# Patient Record
Sex: FEMALE | Race: WHITE | Employment: STUDENT | URBAN - METROPOLITAN AREA
[De-identification: names, ages, dates, MRNs, and addresses within clinical notes are randomized per-mention and may not be internally consistent; named-entity substitution may affect disease eponyms.]

---

## 2018-12-27 ENCOUNTER — HOSPITAL ENCOUNTER (EMERGENCY)
Facility: CLINIC | Age: 23
Discharge: HOME | End: 2018-12-27
Admitting: PHYSICIAN ASSISTANT
Payer: COMMERCIAL

## 2018-12-27 ENCOUNTER — OFFICE VISIT (OUTPATIENT)
Dept: FAMILY MEDICINE | Facility: CLINIC | Age: 23
End: 2018-12-27
Payer: COMMERCIAL

## 2018-12-27 ENCOUNTER — HOSPITAL ENCOUNTER (EMERGENCY)
Facility: CLINIC | Age: 23
Discharge: HOME | End: 2018-12-27
Attending: EMERGENCY MEDICINE | Admitting: EMERGENCY MEDICINE
Payer: COMMERCIAL

## 2018-12-27 VITALS
WEIGHT: 130 LBS | TEMPERATURE: 98 F | HEIGHT: 66 IN | BODY MASS INDEX: 20.89 KG/M2 | RESPIRATION RATE: 16 BRPM | OXYGEN SATURATION: 100 % | DIASTOLIC BLOOD PRESSURE: 77 MMHG | SYSTOLIC BLOOD PRESSURE: 111 MMHG | HEART RATE: 87 BPM

## 2018-12-27 VITALS
TEMPERATURE: 98.4 F | DIASTOLIC BLOOD PRESSURE: 64 MMHG | OXYGEN SATURATION: 97 % | WEIGHT: 130 LBS | HEART RATE: 78 BPM | BODY MASS INDEX: 20.4 KG/M2 | HEIGHT: 67 IN | SYSTOLIC BLOOD PRESSURE: 99 MMHG | RESPIRATION RATE: 15 BRPM

## 2018-12-27 VITALS
TEMPERATURE: 98.4 F | HEART RATE: 89 BPM | OXYGEN SATURATION: 100 % | WEIGHT: 131.8 LBS | DIASTOLIC BLOOD PRESSURE: 69 MMHG | SYSTOLIC BLOOD PRESSURE: 109 MMHG

## 2018-12-27 DIAGNOSIS — F41.9 ANXIETY: Primary | ICD-10-CM

## 2018-12-27 DIAGNOSIS — R25.1 TREMOR: ICD-10-CM

## 2018-12-27 DIAGNOSIS — R20.2 PARESTHESIAS: ICD-10-CM

## 2018-12-27 DIAGNOSIS — R11.15 INTRACTABLE CYCLICAL VOMITING WITH NAUSEA: ICD-10-CM

## 2018-12-27 DIAGNOSIS — F43.0 STRESS RESPONSE: ICD-10-CM

## 2018-12-27 LAB
ALBUMIN SERPL-MCNC: 3.7 G/DL (ref 3.4–5)
ALP SERPL-CCNC: 77 U/L (ref 40–150)
ALT SERPL W P-5'-P-CCNC: 66 U/L (ref 0–50)
ANION GAP SERPL CALCULATED.3IONS-SCNC: 9 MMOL/L (ref 3–14)
AST SERPL W P-5'-P-CCNC: 74 U/L (ref 0–45)
BASOPHILS # BLD AUTO: 0 10E9/L (ref 0–0.2)
BASOPHILS # BLD AUTO: 0.1 10E9/L (ref 0–0.2)
BASOPHILS NFR BLD AUTO: 0.5 %
BASOPHILS NFR BLD AUTO: 0.6 %
BILIRUB SERPL-MCNC: 0.3 MG/DL (ref 0.2–1.3)
BUN SERPL-MCNC: 12 MG/DL (ref 7–30)
CALCIUM SERPL-MCNC: 8.7 MG/DL (ref 8.5–10.1)
CHLORIDE SERPL-SCNC: 105 MMOL/L (ref 94–109)
CO2 SERPL-SCNC: 25 MMOL/L (ref 20–32)
CREAT SERPL-MCNC: 0.75 MG/DL (ref 0.52–1.04)
CRP SERPL-MCNC: <2.9 MG/L (ref 0–8)
DEPRECATED S PYO AG THROAT QL EIA: NORMAL
DEPRECATED S PYO AG THROAT QL EIA: NORMAL
DIFFERENTIAL METHOD BLD: NORMAL
DIFFERENTIAL METHOD BLD: NORMAL
EOSINOPHIL # BLD AUTO: 0.1 10E9/L (ref 0–0.7)
EOSINOPHIL # BLD AUTO: 0.2 10E9/L (ref 0–0.7)
EOSINOPHIL NFR BLD AUTO: 1.3 %
EOSINOPHIL NFR BLD AUTO: 2 %
ERYTHROCYTE [DISTWIDTH] IN BLOOD BY AUTOMATED COUNT: 12.5 % (ref 10–15)
ERYTHROCYTE [DISTWIDTH] IN BLOOD BY AUTOMATED COUNT: 12.6 % (ref 10–15)
ERYTHROCYTE [SEDIMENTATION RATE] IN BLOOD BY WESTERGREN METHOD: 9 MM/H (ref 0–20)
GFR SERPL CREATININE-BSD FRML MDRD: >90 ML/MIN/{1.73_M2}
GLUCOSE SERPL-MCNC: 90 MG/DL (ref 70–99)
HCT VFR BLD AUTO: 38.6 % (ref 35–47)
HCT VFR BLD AUTO: 41.2 % (ref 35–47)
HGB BLD-MCNC: 12.9 G/DL (ref 11.7–15.7)
HGB BLD-MCNC: 13.6 G/DL (ref 11.7–15.7)
IMM GRANULOCYTES # BLD: 0 10E9/L (ref 0–0.4)
IMM GRANULOCYTES NFR BLD: 0.5 %
LYMPHOCYTES # BLD AUTO: 1.9 10E9/L (ref 0.8–5.3)
LYMPHOCYTES # BLD AUTO: 2.4 10E9/L (ref 0.8–5.3)
LYMPHOCYTES NFR BLD AUTO: 23.6 %
LYMPHOCYTES NFR BLD AUTO: 28.1 %
MCH RBC QN AUTO: 29.6 PG (ref 26.5–33)
MCH RBC QN AUTO: 29.9 PG (ref 26.5–33)
MCHC RBC AUTO-ENTMCNC: 33 G/DL (ref 31.5–36.5)
MCHC RBC AUTO-ENTMCNC: 33.4 G/DL (ref 31.5–36.5)
MCV RBC AUTO: 90 FL (ref 78–100)
MCV RBC AUTO: 90 FL (ref 78–100)
MONOCYTES # BLD AUTO: 0.7 10E9/L (ref 0–1.3)
MONOCYTES # BLD AUTO: 0.8 10E9/L (ref 0–1.3)
MONOCYTES NFR BLD AUTO: 10.2 %
MONOCYTES NFR BLD AUTO: 8.4 %
NEUTROPHILS # BLD AUTO: 5 10E9/L (ref 1.6–8.3)
NEUTROPHILS # BLD AUTO: 5.2 10E9/L (ref 1.6–8.3)
NEUTROPHILS NFR BLD AUTO: 61 %
NEUTROPHILS NFR BLD AUTO: 63.8 %
NRBC # BLD AUTO: 0 10*3/UL
NRBC BLD AUTO-RTO: 0 /100
PLATELET # BLD AUTO: 312 10E9/L (ref 150–450)
PLATELET # BLD AUTO: 372 10E9/L (ref 150–450)
POTASSIUM SERPL-SCNC: 3.8 MMOL/L (ref 3.4–5.3)
PROT SERPL-MCNC: 7.6 G/DL (ref 6.8–8.8)
RBC # BLD AUTO: 4.31 10E12/L (ref 3.8–5.2)
RBC # BLD AUTO: 4.6 10E12/L (ref 3.8–5.2)
SODIUM SERPL-SCNC: 139 MMOL/L (ref 133–144)
SPECIMEN SOURCE: NORMAL
T3FREE SERPL-MCNC: 2.4 PG/ML (ref 2.3–4.2)
WBC # BLD AUTO: 7.9 10E9/L (ref 4–11)
WBC # BLD AUTO: 8.5 10E9/L (ref 4–11)

## 2018-12-27 PROCEDURE — 90791 PSYCH DIAGNOSTIC EVALUATION: CPT

## 2018-12-27 PROCEDURE — 96375 TX/PRO/DX INJ NEW DRUG ADDON: CPT

## 2018-12-27 PROCEDURE — 99000 SPECIMEN HANDLING OFFICE-LAB: CPT | Performed by: FAMILY MEDICINE

## 2018-12-27 PROCEDURE — 84481 FREE ASSAY (FT-3): CPT | Performed by: FAMILY MEDICINE

## 2018-12-27 PROCEDURE — 85652 RBC SED RATE AUTOMATED: CPT | Performed by: FAMILY MEDICINE

## 2018-12-27 PROCEDURE — 96374 THER/PROPH/DIAG INJ IV PUSH: CPT

## 2018-12-27 PROCEDURE — 85025 COMPLETE CBC W/AUTO DIFF WBC: CPT | Performed by: EMERGENCY MEDICINE

## 2018-12-27 PROCEDURE — 87880 STREP A ASSAY W/OPTIC: CPT | Performed by: FAMILY MEDICINE

## 2018-12-27 PROCEDURE — 80053 COMPREHEN METABOLIC PANEL: CPT | Performed by: FAMILY MEDICINE

## 2018-12-27 PROCEDURE — 84439 ASSAY OF FREE THYROXINE: CPT | Performed by: FAMILY MEDICINE

## 2018-12-27 PROCEDURE — 40000268 ZZH STATISTIC NO CHARGES

## 2018-12-27 PROCEDURE — 99215 OFFICE O/P EST HI 40 MIN: CPT | Performed by: FAMILY MEDICINE

## 2018-12-27 PROCEDURE — 25000132 ZZH RX MED GY IP 250 OP 250 PS 637: Performed by: EMERGENCY MEDICINE

## 2018-12-27 PROCEDURE — 25000128 H RX IP 250 OP 636: Performed by: EMERGENCY MEDICINE

## 2018-12-27 PROCEDURE — 85025 COMPLETE CBC W/AUTO DIFF WBC: CPT | Performed by: FAMILY MEDICINE

## 2018-12-27 PROCEDURE — 36415 COLL VENOUS BLD VENIPUNCTURE: CPT | Performed by: FAMILY MEDICINE

## 2018-12-27 PROCEDURE — 80053 COMPREHEN METABOLIC PANEL: CPT | Performed by: EMERGENCY MEDICINE

## 2018-12-27 PROCEDURE — 86140 C-REACTIVE PROTEIN: CPT | Performed by: FAMILY MEDICINE

## 2018-12-27 PROCEDURE — 99285 EMERGENCY DEPT VISIT HI MDM: CPT | Mod: 25

## 2018-12-27 PROCEDURE — 84443 ASSAY THYROID STIM HORMONE: CPT | Performed by: FAMILY MEDICINE

## 2018-12-27 PROCEDURE — 80171 DRUG SCREEN QUANT GABAPENTIN: CPT | Mod: 90 | Performed by: FAMILY MEDICINE

## 2018-12-27 RX ORDER — LORAZEPAM 0.5 MG/1
0.5 TABLET ORAL EVERY 6 HOURS PRN
Qty: 20 TABLET | Refills: 0 | Status: SHIPPED | OUTPATIENT
Start: 2018-12-27 | End: 2018-12-27

## 2018-12-27 RX ORDER — PROMETHAZINE HYDROCHLORIDE 25 MG/1
TABLET ORAL
Refills: 0 | COMMUNITY
Start: 2018-05-27

## 2018-12-27 RX ORDER — HALOPERIDOL 5 MG/ML
2.5 INJECTION INTRAMUSCULAR ONCE
Status: COMPLETED | OUTPATIENT
Start: 2018-12-27 | End: 2018-12-27

## 2018-12-27 RX ORDER — PROPRANOLOL HYDROCHLORIDE 40 MG/1
40 TABLET ORAL 2 TIMES DAILY
Qty: 20 TABLET | Refills: 0 | Status: SHIPPED | OUTPATIENT
Start: 2018-12-27 | End: 2019-01-06

## 2018-12-27 RX ORDER — VENLAFAXINE HYDROCHLORIDE 150 MG/1
150 CAPSULE, EXTENDED RELEASE ORAL DAILY
COMMUNITY

## 2018-12-27 RX ORDER — ONDANSETRON 4 MG/1
4 TABLET, FILM COATED ORAL EVERY 6 HOURS PRN
Qty: 25 TABLET | Refills: 1 | Status: SHIPPED | OUTPATIENT
Start: 2018-12-27 | End: 2018-12-27

## 2018-12-27 RX ORDER — PROPRANOLOL HYDROCHLORIDE 40 MG/1
40 TABLET ORAL ONCE
Status: DISCONTINUED | OUTPATIENT
Start: 2018-12-27 | End: 2018-12-27 | Stop reason: HOSPADM

## 2018-12-27 RX ORDER — TRAZODONE HYDROCHLORIDE 50 MG/1
TABLET, FILM COATED ORAL
Refills: 5 | COMMUNITY
Start: 2018-12-10

## 2018-12-27 RX ORDER — LORAZEPAM 2 MG/ML
1 INJECTION INTRAMUSCULAR ONCE
Status: COMPLETED | OUTPATIENT
Start: 2018-12-27 | End: 2018-12-27

## 2018-12-27 RX ORDER — GABAPENTIN 600 MG/1
TABLET ORAL
Refills: 3 | COMMUNITY
Start: 2018-11-27

## 2018-12-27 RX ORDER — LORAZEPAM 0.5 MG/1
0.5 TABLET ORAL EVERY 6 HOURS PRN
Qty: 20 TABLET | Refills: 0 | Status: SHIPPED | OUTPATIENT
Start: 2018-12-27

## 2018-12-27 RX ORDER — ONDANSETRON 4 MG/1
4 TABLET, FILM COATED ORAL EVERY 6 HOURS PRN
Qty: 25 TABLET | Refills: 1 | Status: SHIPPED | OUTPATIENT
Start: 2018-12-27

## 2018-12-27 RX ORDER — LORAZEPAM 0.5 MG/1
TABLET ORAL
Refills: 0 | COMMUNITY
Start: 2018-12-18

## 2018-12-27 RX ORDER — HYDROXYZINE PAMOATE 50 MG/1
CAPSULE ORAL
Refills: 3 | COMMUNITY
Start: 2018-12-13

## 2018-12-27 RX ORDER — HYDROCODONE BITARTRATE AND ACETAMINOPHEN 5; 325 MG/1; MG/1
TABLET ORAL
Refills: 0 | COMMUNITY
Start: 2018-12-17

## 2018-12-27 RX ADMIN — LORAZEPAM 1 MG: 2 INJECTION INTRAMUSCULAR; INTRAVENOUS at 17:59

## 2018-12-27 RX ADMIN — HALOPERIDOL LACTATE 2.5 MG: 5 INJECTION, SOLUTION INTRAMUSCULAR at 18:42

## 2018-12-27 SDOH — HEALTH STABILITY: MENTAL HEALTH: HOW OFTEN DO YOU HAVE A DRINK CONTAINING ALCOHOL?: NEVER

## 2018-12-27 ASSESSMENT — MIFFLIN-ST. JEOR
SCORE: 1385.48
SCORE: 1366.43

## 2018-12-27 ASSESSMENT — ENCOUNTER SYMPTOMS
TREMORS: 1
SORE THROAT: 0
HEADACHES: 1
NUMBNESS: 1
RHINORRHEA: 0
FEVER: 0

## 2018-12-27 NOTE — ED PROVIDER NOTES
History     Chief Complaint:  Tremors    HPI   Karina Pena is a 23 year old female who presents with tremors. The patient reports that recently she has been having more episodes of depression and anxiety due to stress and jaw pain secondary to TMJ. Last week she saw her psychiatrist, Dr. Neil Carter in [place], for this and was started on Trazodone, Seroquel, and Lamotrigine, which she states have helped her somewhat. Last week the patient states she developed tremors in her hands that progressed to tremors in her chest, arms, legs and voice. She also notes having numbness and tingling throughout her extremities as well. These symptoms caused the patient to report to [facility] ED today where she had blood drawn, but due to the long wait, the patient left before being seen and elected to come here. The patient notes that she took lorazepam last night, but she hasn't taken anything yet today due to feeling depressed. The patient denies any fevers, recent illness, caffeine or street drug use. She denies any history of thyroid problems or having had tremors in the past. She has been making many changes in her medications recently.     The patient also notes that she currently has a headache which is common for her. She states she typically has a headache in her forehead when its controlled, and headaches that radiate from her forehead through to the rest of her head with associated photophobia and sound sensitivity when they are uncontrolled. Today the patient attributes her headache to a tension headache.     Blood work obtained at [facility] ED earlier today below.     CBC: AWNL (WBC 7.9, HGB 12.9, )    CMP: AWNL (Creatinine 0.75)      Allergies:  No known drug allergies     Medications:    Tylenol #3  Neurontin  Norco  Vistaril  Ativan  Zofran  Phenergan  Zanaflex  Desyrel  Effexor-XR  quetiapine 50 mg     Past Medical History:    Anxiety  Depression  Insomnia  TMJ  Gastroparesis    Past  "Surgical History:    Endoscopy for Gastroparesis    Family History:    History reviewed. No pertinent family history.     Social History:  Smoking Status: Never Smoker  Alcohol Use: No  Denies Illegal drug use  Patient presents with her .  Marital Status:    The patient is from [place], visiting [place] for the holidays. She returns to [place] on January 4.      Review of Systems   Constitutional: Negative for fever.   HENT: Negative for congestion, rhinorrhea and sore throat.    Neurological: Positive for tremors, numbness and headaches.   All other systems reviewed and are negative.    Physical Exam     Patient Vitals for the past 24 hrs:   BP Temp Temp src Pulse Heart Rate Resp SpO2 Height Weight   12/27/18 1937 -- -- -- -- -- -- 97 % -- --   12/27/18 1935 99/64 -- -- 78 -- -- -- -- --   12/27/18 1708 120/84 -- -- 96 96 15 96 % -- --   12/27/18 1608 121/77 98.4  F (36.9  C) Oral 85 -- 16 100 % 1.707 m (5' 7.2\") 59 kg (130 lb)     Physical Exam  General: Appears anxious. Nontoxic.   Head:  Scalp, face, and head appear normal  Eyes:  Pupils are equal, round, and reactive to light, EOMI, no nystagmus     Conjunctivae non-injected and sclerae white  ENT:    The external nose is normal    Pinnae are normal    The oropharynx is normal, mucous membranes moist    Uvula is in the midline  Neck:  Normal range of motion    There is no rigidity noted    Trachea is in the midline  CV:  Regular rate and rhythm     Normal S1/S2, no S3/S4    No murmur or rub  Resp:  Lungs are clear and equal bilaterally    There is no tachypnea    No increased work of breathing    No rales, wheezing, or rhonchi  GI:  Abdomen is soft, no rigidity or guarding    No distension, or mass    No tenderness or rebound tenderness   MS:  Normal muscular tone    Symmetric motor strength    No lower extremity edema  Skin:  No rash or acute skin lesions noted  Neuro: A&Ox3, GCS 15    CN II - XII intact    Speech clear, fluent, and normal    Strength 5/5 " "and symmetric in bilateral upper and lower extremities.    No pronator drift. No leg drift. SILT throughout.    no ankle clonus    FTN testing normal. Heel to shin normal.      No meningismus   Psych:  Anxious teardful affect. Patient exhibits psychomotor agitation with tremors which wane with distraction or intention. She endorses depressed and anxious mood. She denies active SI with plan but states that \"I don't want to do this anymore.\" Denies HI.      Emergency Department Course     Interventions:    1759: Ativan 1 mg IV  1842: Haldol 2.5 mg IV    Emergency Department Course:  Past medical records, nursing notes, and vitals reviewed.  1642: I performed an exam of the patient and obtained history, as documented above.    Interventions administered as noted above.     1750: I rechecked the patient.     1907: I consulted with DEC regarding the patient.    The patient was evaluated by DEC.    1930: I consulted with DEC regarding their assessment of the patient.     1700: I rechecked the patient. Findings and plan explained to the Patient. Patient discharged home with instructions regarding supportive care, medications, and reasons to return. The importance of close follow-up was reviewed.     Impression & Plan      Medical Decision Making:  Karina Pena is a 23 year old female who presents for evaluation of anxiety/panic with psychosomatic symptoms including tremor and paresthesias. Her neurologic examination is nonfocal. The patient has a history of psychiatric illness including depression and anxiety. She has had recent changes to her medications and increased stress due to traveling from [place] to spend the holidays with family. Patient has good support from her .   There is history of anxiety in the past and they are on medications.  She feels improved after interventions as noted above in the Emergency Department.  There is no signs at this point of a general medical problem causing anxiety (PE, " hyperthyroidism, other metabolic derangement, infection, etc).  Given possible passive SI the patient was evaluated by DEC who felt that she did not pose a significant risk of self harm at this time. The patient will be with her  continuously as they are traveling together. I have recommended continuing her outpatient medications and following up closely with her psychiatrist and therapist at home in [place]. I did have DEC evaluate the patient, their recommendations are noted in separate note.   They as well agree with the above plan. The patient felt improved after the above interventions. Return precautions were discussed with patient. The patient's questions were answered and the patient was agreeable with discharge.       Diagnosis:    ICD-10-CM    1. Stress response F43.0    2. Tremor R25.1    3. Paresthesias R20.2        Disposition:  Discharged to home.    Discharge Medications:     Medication List      Started    propranolol 40 MG tablet  Commonly known as:  INDERAL  40 mg, Oral, 2 TIMES DAILY, As needed for tremors              Maya Sewell  12/27/2018    EMERGENCY DEPARTMENT  I, Maya Sewell, am serving as a scribe at 4:42 PM on 12/27/2018 to document services personally performed by Eddi Smith MD based on my observations and the provider's statements to me.        Eddi Smith MD  12/29/18 5600

## 2018-12-27 NOTE — ED AVS SNAPSHOT
Emergency Department  [address] [place] [id number]  Phone:  [phone]  Fax:  [phone]                                    Karina Pena   MRN: [id number]    Department:   Emergency Department   Date of Visit:  12/27/2018           After Visit Summary Signature Page    I have received my discharge instructions, and my questions have been answered. I have discussed any challenges I see with this plan with the nurse or doctor.    ..........................................................................................................................................  Patient/Patient Representative Signature      ..........................................................................................................................................  Patient Representative Print Name and Relationship to Patient    ..................................................               ................................................  Date                                   Time    ..........................................................................................................................................  Reviewed by Signature/Title    ...................................................              ..............................................  Date                                               Time          22EPIC Rev 08/18

## 2018-12-27 NOTE — PROGRESS NOTES
SUBJECTIVE:   Karina Pena is a 23 year old female who presents to clinic today for the following health issues:    Visiting from [place] where her Psych has an extremely diverse regimen for her anxiety, tmj and depression  Attends with her .  Patient is here for tremors in the hands, legs, and voice which started about a week ago after new meds were started seroquel and increased lamictal on top of   Current Outpatient Medications   Medication     acetaminophen-codeine (TYLENOL #3) 300-30 MG tablet     gabapentin (NEURONTIN) 600 MG tablet     HYDROcodone-acetaminophen (NORCO) 5-325 MG tablet     hydrOXYzine (VISTARIL) 50 MG capsule     LORazepam (ATIVAN) 0.5 MG tablet     LORazepam (ATIVAN) 0.5 MG tablet     ondansetron (ZOFRAN) 4 MG tablet     promethazine (PHENERGAN) 25 MG tablet     tiZANidine (ZANAFLEX) 4 MG tablet     traZODone (DESYREL) 50 MG tablet     venlafaxine (EFFEXOR-XR) 150 MG 24 hr capsule     No current facility-administered medications for this visit.          Complains of bilateral hand numbness and tingling, no fever, no malaise,      REVIEW OF SYSTEMS    Generally has been not  feeling well for months, her evolving regimen hasn't really helped her . No problems with vision, hearing, dental or neck pain.Has no airborne or ingestion allergy  No chest pain, palpitations, dyspnea, change in bowel habits, blood  in stool or dyspepsia. She has a diagnosis of gastroparesis.  No rashes, changing moles, weakness, lassitude or back problems.  No chronic issues . No dysuria  Patient not  a smoker. Has  problems with significant headaches.    On exam the vital signs are stable  Weight is There is no height or weight on file to calculate BMI.   Eyes show christine  No neck masses or thyromegaly.Ear nose and throat shows normal  No bruits, murmers, rubs or extrasounds. No cardiomegaly or chest wall tenderness. Lungs clear, no abdominal masses or organomegaly. No CVA tenderness.  Skin eval no rash, no  cyanosis, she has a coarse tremor to both arms and hands difficult to determine if it's organic or function  No hernias, good range of motion neck, back and extremities. No abnormal skin lesions. Normal genitalia. Good peripheral pulses. No adenopathy.  Normal gait and stance. Neck is supple.  Back exam shows good rom     (F41.9) Anxiety  (primary encounter diagnosis)  Comment: after her labs she expressed a desire to go to the ER and her  agrees to take her. She fears her numbness   Plan: Gabapentin level, Rapid strep screen, *UA         reflex to Microscopic and Culture ([facility]         [facility] (except [place] and         [place])          50 minutes time spent mostly in med planning and counseling and arranging her ER  (R25.1) Tremor  Comment:   Plan: Comprehensive metabolic panel, Gabapentin         level, Erythrocyte sedimentation rate auto,         CRP, inflammation, CBC with platelets and         differential, TSH, T4, free, T3, Free, Rapid         strep screen, *UA reflex to Microscopic and         Culture ([facility] (except         [place] and [place]), CANCELED: *UA reflex         to Microscopic and Culture ([facility] (except [place] and [place])        Some of her meds are phenothiazines    (G43.A1) Icyclical vomiting with nausea and gastroparesisComment:   Plan: LORazepam (ATIVAN) 0.5 MG tablet, ondansetron         (ZOFRAN) 4 MG tablet, *UA reflex to Microscopic        and Culture ([facility] (except        [place] and [place]), DISCONTINUED:         LORazepam (ATIVAN) 0.5 MG tablet, DISCONTINUED:        ondansetron (ZOFRAN) 4 MG tablet          I called [facility] T[facility] about having her re evaluated there with more available lab turnaround and imaging if needed.after she expressed her fear and wanted to go.

## 2018-12-27 NOTE — ED TRIAGE NOTES
Complains of feeling shaky all over, tremors, numbness.  History of anxiety.  ABCDs intact.  Went to clinic today, sent to ED.

## 2018-12-27 NOTE — LETTER
December 31, 2018      Karina Pena  [address]  [place] [address]        Dear ,  Here are the lab tests for your psychiatrist and Family doc  in [place] to look at. No strep seen, minimal liver irritations (could be from medication) and thyroid at the low end but not clearly low. Find enclosed copies of the results.       Resulted Orders   Comprehensive metabolic panel   Result Value Ref [facility]    Sodium 134 133 - 144 mmol/L    Potassium 3.4 3.4 - 5.3 mmol/L    Chloride 103 94 - 109 mmol/L    Carbon Dioxide 22 20 - 32 mmol/L    Anion Gap 9 3 - 14 mmol/L    Glucose 91 70 - 99 mg/dL    Urea Nitrogen 11 7 - 30 mg/dL    Creatinine 0.81 0.52 - 1.04 mg/dL    GFR Estimate >90 >60 mL/min/[1.73_m2]      Comment:      Non  GFR Calc  Starting 12/18/2018, serum creatinine based estimated GFR (eGFR) will be   calculated using the Chronic Kidney Disease Epidemiology Collaboration   (CKD-EPI) equation.      GFR Estimate If Black >90 >60 mL/min/[1.73_m2]      Comment:       GFR Calc  Starting 12/18/2018, serum creatinine based estimated GFR (eGFR) will be   calculated using the Chronic Kidney Disease Epidemiology Collaboration   (CKD-EPI) equation.      Calcium 10.0 8.5 - 10.1 mg/dL    Bilirubin Total 0.3 0.2 - 1.3 mg/dL    Albumin 4.3 3.4 - 5.0 g/dL    Protein Total 8.0 6.8 - 8.8 g/dL    Alkaline Phosphatase 78 40 - 150 U/L    ALT 68 (H) 0 - 50 U/L    AST 66 (H) 0 - 45 U/L   Gabapentin level   Result Value Ref [facility]    Gabapentin Level 3.8 (L) 4.0 - 16.0 ug/mL      Comment:      Analysis performed by [email or web address] [place] [address]   Erythrocyte sedimentation rate auto   Result Value Ref [facility]    Sed Rate 9 0 - 20 mm/h   CRP, inflammation   Result Value Ref [facility]    CRP Inflammation <2.9 0.0 - 8.0 mg/L   CBC with platelets and differential   Result Value Ref [facility]    WBC 8.5 4.0 - 11.0 10e9/L    RBC Count 4.60 3.8 - 5.2 10e12/L    Hemoglobin 13.6 11.7 -  15.7 g/dL    Hematocrit 41.2 35.0 - 47.0 %    MCV 90 78 - 100 fl    MCH 29.6 26.5 - 33.0 pg    MCHC 33.0 31.5 - 36.5 g/dL    RDW 12.6 10.0 - 15.0 %    Platelet Count 372 150 - 450 10e9/L    % Neutrophils 61.0 %    % Lymphocytes 28.1 %    % Monocytes 8.4 %    % Eosinophils 2.0 %    % Basophils 0.5 %    Absolute Neutrophil 5.2 1.6 - 8.3 10e9/L    Absolute Lymphocytes 2.4 0.8 - 5.3 10e9/L    Absolute Monocytes 0.7 0.0 - 1.3 10e9/L    Absolute Eosinophils 0.2 0.0 - 0.7 10e9/L    Absolute Basophils 0.0 0.0 - 0.2 10e9/L    Diff Method Automated Method    TSH   Result Value Ref [facility]    TSH 0.78 0.40 - 4.00 mU/L   T4, free   Result Value Ref [facility]    T4 Free 0.72 (L) 0.76 - 1.46 ng/dL   T3, Free   Result Value Ref [facility]    Free T3 2.4 2.3 - 4.2 pg/mL   Rapid strep screen   Result Value Ref [facility]    Specimen Description Throat     Rapid Strep A Screen Canceled, Test credited     Rapid Strep A Screen Test canceled by physician        If you have any questions or concerns, please call the clinic at the number listed above.       Sincerely,        Thien Varghese MD

## 2018-12-28 LAB
ALBUMIN SERPL-MCNC: 4.3 G/DL (ref 3.4–5)
ALP SERPL-CCNC: 78 U/L (ref 40–150)
ALT SERPL W P-5'-P-CCNC: 68 U/L (ref 0–50)
ANION GAP SERPL CALCULATED.3IONS-SCNC: 9 MMOL/L (ref 3–14)
AST SERPL W P-5'-P-CCNC: 66 U/L (ref 0–45)
BILIRUB SERPL-MCNC: 0.3 MG/DL (ref 0.2–1.3)
BUN SERPL-MCNC: 11 MG/DL (ref 7–30)
CALCIUM SERPL-MCNC: 10 MG/DL (ref 8.5–10.1)
CHLORIDE SERPL-SCNC: 103 MMOL/L (ref 94–109)
CO2 SERPL-SCNC: 22 MMOL/L (ref 20–32)
CREAT SERPL-MCNC: 0.81 MG/DL (ref 0.52–1.04)
GFR SERPL CREATININE-BSD FRML MDRD: >90 ML/MIN/{1.73_M2}
GLUCOSE SERPL-MCNC: 91 MG/DL (ref 70–99)
POTASSIUM SERPL-SCNC: 3.4 MMOL/L (ref 3.4–5.3)
PROT SERPL-MCNC: 8 G/DL (ref 6.8–8.8)
SODIUM SERPL-SCNC: 134 MMOL/L (ref 133–144)
T4 FREE SERPL-MCNC: 0.72 NG/DL (ref 0.76–1.46)
TSH SERPL DL<=0.005 MIU/L-ACNC: 0.78 MU/L (ref 0.4–4)

## 2018-12-28 NOTE — ED NOTES
Patient's spouse spoke to another RN at triage and stated that they were going to go to [facility] to be seen because there was no wait there.  Patient and spouse left our ED.  Patient still had IV in place in left AC.

## 2018-12-29 LAB — GABAPENTIN SERPLBLD-MCNC: 3.8 UG/ML

## 2019-11-25 ENCOUNTER — TELEPHONE (OUTPATIENT)
Dept: FAMILY MEDICINE | Facility: CLINIC | Age: 24
End: 2019-11-25

## 2019-11-25 NOTE — TELEPHONE ENCOUNTER
Panel Management Review      Patient has the following on her problem list: None      Composite cancer screening  Chart review shows that this patient is due/due soon for the following Pap Smear  Summary:    Patient is due/failing the following:   PAP and PHYSICAL    Action needed:   Patient needs office visit for Pap and physical.    Type of outreach:  Called pt voice box full so  Sent letter.    Questions for provider review:    None                                                                                                                                    Yulia Hernandez       Chart routed to none .